# Patient Record
Sex: FEMALE | Race: WHITE | Employment: OTHER | URBAN - METROPOLITAN AREA
[De-identification: names, ages, dates, MRNs, and addresses within clinical notes are randomized per-mention and may not be internally consistent; named-entity substitution may affect disease eponyms.]

---

## 2021-08-23 ENCOUNTER — APPOINTMENT (OUTPATIENT)
Dept: CT IMAGING | Age: 71
End: 2021-08-23
Payer: MEDICARE

## 2021-08-23 ENCOUNTER — HOSPITAL ENCOUNTER (EMERGENCY)
Age: 71
Discharge: HOME | End: 2021-08-23
Attending: EMERGENCY MEDICINE
Payer: MEDICARE

## 2021-08-23 VITALS
HEIGHT: 62 IN | BODY MASS INDEX: 25.76 KG/M2 | WEIGHT: 140 LBS | DIASTOLIC BLOOD PRESSURE: 79 MMHG | TEMPERATURE: 98 F | OXYGEN SATURATION: 98 % | HEART RATE: 77 BPM | SYSTOLIC BLOOD PRESSURE: 177 MMHG | RESPIRATION RATE: 17 BRPM

## 2021-08-23 DIAGNOSIS — S09.90XA INJURY OF HEAD, INITIAL ENCOUNTER: Primary | ICD-10-CM

## 2021-08-23 DIAGNOSIS — S00.212A ABRASION OF LEFT EYEBROW, INITIAL ENCOUNTER: ICD-10-CM

## 2021-08-23 DIAGNOSIS — S05.12XA PERIORBITAL CONTUSION OF LEFT EYE, INITIAL ENCOUNTER: ICD-10-CM

## 2021-08-23 PROCEDURE — 6360000002 HC RX W HCPCS: Performed by: EMERGENCY MEDICINE

## 2021-08-23 PROCEDURE — 70450 CT HEAD/BRAIN W/O DYE: CPT

## 2021-08-23 PROCEDURE — 90715 TDAP VACCINE 7 YRS/> IM: CPT | Performed by: EMERGENCY MEDICINE

## 2021-08-23 PROCEDURE — 90471 IMMUNIZATION ADMIN: CPT | Performed by: EMERGENCY MEDICINE

## 2021-08-23 PROCEDURE — 99284 EMERGENCY DEPT VISIT MOD MDM: CPT

## 2021-08-23 PROCEDURE — 99283 EMERGENCY DEPT VISIT LOW MDM: CPT

## 2021-08-23 RX ORDER — DIAPER,BRIEF,INFANT-TODD,DISP
EACH MISCELLANEOUS ONCE
Status: DISCONTINUED | OUTPATIENT
Start: 2021-08-23 | End: 2021-08-23 | Stop reason: HOSPADM

## 2021-08-23 RX ADMIN — TETANUS TOXOID, REDUCED DIPHTHERIA TOXOID AND ACELLULAR PERTUSSIS VACCINE, ADSORBED 0.5 ML: 5; 2.5; 8; 8; 2.5 SUSPENSION INTRAMUSCULAR at 14:46

## 2021-08-23 ASSESSMENT — PAIN DESCRIPTION - LOCATION: LOCATION: HEAD

## 2021-08-23 ASSESSMENT — PAIN DESCRIPTION - FREQUENCY: FREQUENCY: CONTINUOUS

## 2021-08-23 ASSESSMENT — PAIN DESCRIPTION - DESCRIPTORS: DESCRIPTORS: ACHING

## 2021-08-23 ASSESSMENT — PAIN SCALES - GENERAL: PAINLEVEL_OUTOF10: 3

## 2021-08-23 NOTE — ED TRIAGE NOTES
The patient presents to the ED SP fall yesterday after getting lightheaded. She hit her head on a wood rail. NO LOC she is not anticoagulated.

## 2021-08-23 NOTE — ED NOTES
The patient went to the  to pay her bill and left the building without her discharge paper work.      Salvador Moore RN  08/23/21 [id number]

## 2024-01-19 NOTE — ED PROVIDER NOTES
eMERGENCY dEPARTMENT eNCOUnter        [facility]    Chief Complaint   Patient presents with    Fall       HPI    Shabbir Corrales is a 69 y.o. female who presents with a head injury. She had a fall yesterday struck her head. Reports that she hit the eyebrow on the arm of the chair. Reports no loss of consciousness. She takes baby aspirin. States some bruising around the eye. No change in vision. She is not sure the last time she had tetanus shot. Denies neck or back pain. No other extremity injury. REVIEW OF SYSTEMS    Constitutional:  Denies fever, chills  Neurologic:  See HPI. Denies LOC. Denies confusion or memory loss. Eyes:  Denies diplopia, blurred vision, or vision loss. Ears: no ear trauma or hearing changes  Musculoskeletal:  See HPI. No upper or lower extremity injuries. Cardiac: No Chest Pain, palpitations, or Chest Injury  Respiratory:  Denies cough, shortness of breath  GI: No nausea or vomiting. : No Dysuria or Hematuria   Skin:  Left eyebrow abrasion,  Denies rash. All other review of systems are negative  See HPI and nursing notes for additional information         PAST MEDICAL & SURGICAL HISTORY    No past medical history on file. No past surgical history on file. CURRENT MEDICATIONS        ALLERGIES    No Known Allergies    SOCIAL & FAMILY HISTORY    Social History     Socioeconomic History    Marital status:       Spouse name: Not on file    Number of children: Not on file    Years of education: Not on file    Highest education level: Not on file   Occupational History    Not on file   Tobacco Use    Smoking status: Never Smoker    Smokeless tobacco: Never Used   Vaping Use    Vaping Use: Never used   Substance and Sexual Activity    Alcohol use: Never    Drug use: Never    Sexual activity: Not on file   Other Topics Concern    Not on file   Social History Narrative    Not on file     Social Determinants of Health     Financial Resource Strain:  Difficulty of Paying Living Expenses:    Food Insecurity:     Worried About Running Out of Food in the Last Year:     Ran Out of Food in the Last Year:    Transportation Needs:     Lack of Transportation (Medical):  Lack of Transportation (Non-Medical):    Physical Activity:     Days of Exercise per Week:     Minutes of Exercise per Session:    Stress:     Feeling of Stress :    Social Connections:     Frequency of Communication with Friends and Family:     Frequency of Social Gatherings with Friends and Family:     Attends [employer] Services:     Active Member of Clubs or Organizations:     Attends Club or Organization Meetings:     Marital Status:    Intimate Partner Violence:     Fear of Current or Ex-Partner:     Emotionally Abused:     Physically Abused:     Sexually Abused:      No family history on file. PHYSICAL EXAM    VITAL SIGNS: BP (!) 177/79   Pulse 77   Temp 98 °F (36.7 °C)   Resp 17   Ht 5' 2\" (1.575 m)   Wt 140 lb (63.5 kg)   SpO2 98%   BMI 25.61 kg/m²    Constitutional:  Well developed, well nourished, no acute distress   Scalp: left eyebrow abrasion, ecchymosis around the periorbital tissues of the left eye. Neck:    No swelling or discoloration on inspection. No posterior midline neck tenderness. No pain or deficit on range of motion. Eyes: PERRL. EOMI. No hyphema. Periorbital ecchymosis of the left eye. HENT:   Airway patent. Nares patent, no septal hematoma  Oropharynx clear, dentition intact  No signs of basilar skull fracture    Stable facial bones. Respiratory:  Lungs Clear, normal effort. Cardiovascular:  Reg rate and rhythm, no murmurs  GI:  Soft, nontender  Musculoskeletal:  No edema, no acute deformities  Integument:  Well hydrated, no rash   Neurologic:   Alert & oriented, no slurred speech, GCS 15. Cranial nerves 2-12 grossly intact.  Motor function and sensation are grossly normal.  Patient ambulates in ED without balance or coordination problems. IMAGING:  CT Head:  CT HEAD WO CONTRAST    Result Date: 8/23/2021  EXAMINATION: CT OF THE HEAD WITHOUT CONTRAST  8/23/2021 2:41 pm TECHNIQUE: CT of the head was performed without the administration of intravenous contrast. Dose modulation, iterative reconstruction, and/or weight based adjustment of the mA/kV was utilized to reduce the radiation dose to as low as reasonably achievable. COMPARISON: None. HISTORY: ORDERING SYSTEM PROVIDED HISTORY: fall TECHNOLOGIST PROVIDED HISTORY: Has a \"code stroke\" or \"stroke alert\" been called? ->No Reason for exam:->fall Decision Support Exception - unselect if not a suspected or confirmed emergency medical condition->Emergency Medical Condition (MA) FINDINGS: BRAIN/VENTRICLES: There is no acute intracranial hemorrhage, mass effect or midline shift. No abnormal extra-axial fluid collection. The gray-white differentiation is maintained without evidence of an acute infarct. There is no evidence of hydrocephalus. There is diffuse cerebral atrophy. Periventricular white matter low densities are present most likely due to chronic microvascular ischemia ORBITS: The visualized portion of the orbits demonstrate no acute abnormality. SINUSES: The visualized paranasal sinuses and mastoid air cells demonstrate no acute abnormality. SOFT TISSUES/SKULL:  No acute abnormality of the visualized skull or soft tissues. No acute intracranial abnormality. ED COURSE & MEDICAL DECISION MAKING      Vital signs and nursing notes reviewed during ED course. All pertinent Lab data and radiographic results reviewed with patient at bedside. The patient and/or the family were informed of the results of any tests/labs/imaging, the treatment plan, and time was allotted to answer questions. 74-year-old female who presented following fall with head injury. She had patient to the left eyebrow, no need for sutures at this point, this was cleaned, dressed.   CT scan of the head [Patient reported PAP Smear was normal] : Patient reported PAP Smear was normal showed no intracranial abnormality. No obvious skill fracture. She is neurologically intact, ambulatory in the emergency department. We will plan for discharge home with continued symptomatic care, icing to the area and local wound care of the abrasion. Should follow-up outpatient with a primary care provider for recheck and return with new or worsening signs or symptoms. Differential Diagnosis: Epidural Hematoma, Subdural Hematoma, Parenchymal Brain contusion or bleed, Subarachnoid hemorrhage, Skull Fracture, Neck Fracture or Dislocation, other. The likelihood of other entities in the differential is insufficient to justify any further testing for them. This was explained to the patient. The patient was advised that persistent or worsening symptoms would require further evaluation. Clinical  IMPRESSION    Fall, Head injury, eyebrow abrasion, periorbital contusion      Diagnosis and plan discussed in detail with patient who understands and agrees. Return to emergency Department precautions, which included any change in nature or severity of symptoms, development of numbness/tingling, or weakness, or any new symptoms, were discussed in detail with patient who understands and agrees.       (Please note the MDM and HPI sections of this note were completed with a voice recognition program.  Efforts were made to edit the dictations but occasionally words are mis-transcribed.)        La Nena Morse,   08/23/21 1521 [postmenopausal] : postmenopausal [N] : Patient is not sexually active [Y] : Positive pregnancy history [FreeTextEntry1] : Patient is here for ER f/u on 1/15/24 for vaginal discharge. Patient reports yellow vaginal discharge. Patient reports blood tinge on tissue after using the bathroom with mild pelvic discomfort. Patient currently taking Flagyl BID x 7 days.   LMP~ 14 years ago  Last Pap (06/2023) Neg / (-) HPV  [Mammogramdate] : 2022 [PapSmeardate] : 6/10/22 [LMPDate] : 14 yrs agoago [PGHxTotal] : 3 [[facility] [place]] : 1 [PGxPremature] : 1 [PGHxAbortions] : 1 [[facility]] : [facility] [PGHxABInduced] : 0 [PGHxABSpont] : 1 [PGHxEctopic] : 0 [PGHxMultBirths] : 0